# Patient Record
Sex: FEMALE | URBAN - METROPOLITAN AREA
[De-identification: names, ages, dates, MRNs, and addresses within clinical notes are randomized per-mention and may not be internally consistent; named-entity substitution may affect disease eponyms.]

---

## 2020-10-29 ENCOUNTER — APPOINTMENT (RX ONLY)
Dept: URBAN - METROPOLITAN AREA CLINIC 123 | Facility: CLINIC | Age: 65
Setting detail: DERMATOLOGY
End: 2020-10-29

## 2020-10-29 DIAGNOSIS — Z41.9 ENCOUNTER FOR PROCEDURE FOR PURPOSES OTHER THAN REMEDYING HEALTH STATE, UNSPECIFIED: ICD-10-CM

## 2020-10-29 PROCEDURE — ? BOTOX (U OR CC)

## 2020-10-29 PROCEDURE — ? JUVEDERM ULTRA PLUS INJECTION

## 2020-10-29 NOTE — PROCEDURE: JUVEDERM ULTRA PLUS INJECTION
Filler: Juvederm Ultra Plus
Additional Area 2 Volume In Cc: 0
Price (Use Numbers Only, No Special Characters Or $): 2025
Consent: Written consent obtained. Risks include but not limited to bruising, beading, irregular texture, ulceration, infection, allergic reaction, scar formation, incomplete augmentation, temporary nature, procedural pain.
Topical Anesthesia?: 23% lidocaine, 7% tetracaine
Post-Care Instructions: Patient instructed to apply ice to reduce swelling. Arnica and fresh pineapple will reduce bruising and swelling. Avoid exercise/sweating for 24 hours.
Include Cannula Information In Note?: No
Procedural Text: The filler was administered to the treatment areas noted above.
Number Of Syringes (Required For Inventory): 3
Map Statment: See Attach Map for Complete Details
Detail Level: Detailed

## 2020-10-29 NOTE — PROCEDURE: BOTOX (U OR CC)
Additional Area 6 Units: 0
Post-Care Instructions: Patient instructed to not lie down for 4 hours and limit physical activity for 24 hours.
Depressor Anguli Oris Units: 4
Document As Units Or Cc?: units
Consent: Written consent obtained. Risks include but not limited to lid/brow ptosis, bruising, swelling, diplopia, temporary effect, incomplete chemical denervation.
Including Pricing Information In The Note: No
Detail Level: Detailed
Price (Use Numbers Only, No Special Characters Or $): 60